# Patient Record
Sex: FEMALE | Race: WHITE
[De-identification: names, ages, dates, MRNs, and addresses within clinical notes are randomized per-mention and may not be internally consistent; named-entity substitution may affect disease eponyms.]

---

## 2021-02-02 ENCOUNTER — HOSPITAL ENCOUNTER (EMERGENCY)
Dept: HOSPITAL 97 - ER | Age: 21
Discharge: LEFT BEFORE DISCHARGE | End: 2021-02-02
Payer: COMMERCIAL

## 2021-02-02 DIAGNOSIS — Z53.21: ICD-10-CM

## 2021-02-02 DIAGNOSIS — R69: Primary | ICD-10-CM

## 2021-02-02 NOTE — ER
Nurse's Notes                                                                                     

 [facility]                                                                 

Name: Kaylin Cai                                                                                 

Age: 20 yrs                                                                                       

Sex: Female                                                                                       

: 2000                                                                                   

MRN: [id number]                                                                                   

Arrival Date: 2021                                                                          

Time: 15:43                                                                                       

Account#: [id number]                                                                            

Bed Waiting                                                                                       

Private MD:                                                                                       

Diagnosis:                                                                                        

                                                                                                  

ED Course:                                                                                        

                                                                                             

15:43 Patient arrived in ED.                                                                  ds1 

                                                                                                  

Administered Medications:                                                                         

No medications were administered                                                                  

                                                                                                  

                                                                                                  

Outcome:                                                                                          

17:31 Eloped from waiting room, Time discovered patient gone: 2021 at 17:31      iw  

17:31 Patient left the ED.                                                                    iw  

                                                                                                  

Signatures:                                                                                       

Alyssa Aguilar                                ds1                                                  

Lorena Grullon, RN                     RN   iw                                                   

                                                                                                  

**************************************************************************************************

## 2021-06-26 ENCOUNTER — HOSPITAL ENCOUNTER (EMERGENCY)
Dept: HOSPITAL 97 - ER | Age: 21
Discharge: HOME | End: 2021-06-26
Payer: COMMERCIAL

## 2021-06-26 VITALS — DIASTOLIC BLOOD PRESSURE: 69 MMHG | OXYGEN SATURATION: 98 % | SYSTOLIC BLOOD PRESSURE: 136 MMHG

## 2021-06-26 VITALS — TEMPERATURE: 98.2 F

## 2021-06-26 DIAGNOSIS — Z32.02: Primary | ICD-10-CM

## 2021-06-26 PROCEDURE — 99283 EMERGENCY DEPT VISIT LOW MDM: CPT

## 2021-06-26 PROCEDURE — 81003 URINALYSIS AUTO W/O SCOPE: CPT

## 2021-06-26 NOTE — EDPHYS
Physician Documentation                                                                           

 [facility]                                                                 

Name: Kaylin Cai                                                                                 

Age: 20 yrs                                                                                       

Sex: Female                                                                                       

: 2000                                                                                   

MRN: [id number]                                                                                   

Arrival Date: 2021                                                                          

Time: 18:50                                                                                       

Account#: [id number]                                                                            

Bed 16                                                                                            

Private MD:                                                                                       

ED Physician Noah Gonzalez                                                                      

HPI:                                                                                              

                                                                                             

19:05 This 20 yrs old  Female presents to ER via Ambulatory with complaints of       cp  

      Abdominal Cramping - needs preg test.                                                       

19:05 The patient presents with abdominal cramps.                                             cp  

19:05 Onset: The symptoms/episode began/occurred 2 day(s) ago.                                cp  

19:05 Associated signs and symptoms: Pertinent positives: vaginal discharge, Pertinent        cp  

      negatives: nausea, vomiting, and diarrhea, constipation, dysuria, fever. The symptoms       

      are described as intermittent. Patient presents to ED requesting pregnancy test.            

      Reports abdominal cramping and taking multiple home pregnancy test that were negative       

      and positive.                                                                               

                                                                                                  

OB/GYN:                                                                                           

19:49 LMP 2021                                                                              ca1 

                                                                                                  

Historical:                                                                                       

- Allergies:                                                                                      

19:05 No Known Allergies;                                                                     ca1 

- Home Meds:                                                                                      

19:05 None [Active];                                                                          ca1 

- PMHx:                                                                                           

19:05 None;                                                                                   ca1 

- PSHx:                                                                                           

19:05 None;                                                                                   ca1 

                                                                                                  

- Immunization history:: Client reports having NOT received the Covid vaccine. Flu                

  vaccine is up to date.                                                                          

- Social history:: Smoking status: Patient reports the use of cigarette tobacco                   

  products, smokes one-half pack cigarettes per day.                                              

                                                                                                  

                                                                                                  

ROS:                                                                                              

19:10 Abdomen/GI: Positive for abdominal cramps, Negative for nausea, vomiting, and diarrhea. cp  

19:10 Eyes: Negative for injury, pain, redness, and discharge.                                cp  

19:10 Constitutional: Negative for fever.                                                         

19:10 Cardiovascular: Negative for chest pain.                                                    

19:10 Respiratory: Negative for cough, shortness of breath, wheezing.                             

19:10 : Positive for vaginal discharge, Negative for urinary symptoms, vaginal bleeding.        

19:10 All other systems are negative.                                                             

                                                                                                  

Exam:                                                                                             

19:15 Constitutional: The patient appears in no acute distress, alert, awake, non-toxic, well cp  

      developed, well nourished.                                                                  

19:15 Head/Face:  Normocephalic, atraumatic.                                                  cp  

19:15 Chest/axilla: Inspection: normal.                                                           

19:15 Cardiovascular: Rate: normal.                                                               

19:15 Respiratory: the patient does not display signs of respiratory distress,  Respirations:     

      normal, no use of accessory muscles, no retractions.                                        

19:15 Abdomen/GI: Exam negative for discomfort, distension, guarding, Inspection: abdomen         

      appears normal.                                                                             

                                                                                                  

Vital Signs:                                                                                      

19:05  / 71; Pulse 73; Resp 16 S; Temp 98.2(O); Pulse Ox 95% on R/A; Weight 65.77 kg    ca1 

      (R); Height 5 ft. 2 in. (157.48 cm) (R); Pain 3/10;                                         

19:49  / 69; Pulse 76; Resp 16 S; Pulse Ox 98% on R/A;                                  ca1 

19:05 Body Mass Index 26.52 (65.77 kg, 157.48 cm)                                             ca1 

                                                                                                  

MDM:                                                                                              

19:07 Patient medically screened.                                                             nae 

19:15 Differential diagnosis: Ectopic Pregnancy, Pelvic Inflammatory Disease, urinary tract   cp  

      infection.                                                                                  

19:47 Data reviewed: vital signs, nurses notes, lab test result(s), and as a result, I will   cp  

      discharge patient.                                                                          

19:47 Counseling: I had a detailed discussion with the patient and/or guardian regarding: the cp  

      historical points, exam findings, and any diagnostic results supporting the                 

      discharge/admit diagnosis, lab results, to return to the emergency department if            

      symptoms worsen or persist or if there are any questions or concerns that arise at home.    

                                                                                                  

                                                                                             

19:32 Order name: Urine Dipstick-Ancillary; Complete Time: 19:41                              EDMS

                                                                                             

19:46 Interpretation: Normal except: UKET Trace.                                              cp  

                                                                                             

19:35 Order name: Urine Pregnancy--Ancillary (enter results)                                  mw2 

                                                                                             

18:58 Order name: Urine Dipstick-Ancillary (obtain specimen); Complete Time: 19:34            cp  

                                                                                             

18:58 Order name: Urine Pregnancy Test (obtain specimen); Complete Time: 19:34                cp  

                                                                                                  

Administered Medications:                                                                         

No medications were administered                                                                  

                                                                                                  

                                                                                                  

Disposition:                                                                                      

                                                                                             

05:35 Co-signature as Attending Physician, Noah Gonzalez MD.                                 [id number] 

                                                                                                  

Disposition:                                                                                      

21 19:47 Discharged to Home. Impression: Encounter for pregnancy test, result negative.     

- Condition is Stable.                                                                            

- Discharge Instructions: Health Maintenance, Female.                                             

                                                                                                  

- Medication Reconciliation Form, Thank You Letter, Antibiotic Education, Prescription            

  Opioid Use form.                                                                                

- Follow up: Private Physician; When: 2 - 3 days; Reason: Worsening of condition.                 

- Problem is new.                                                                                 

- Symptoms have improved.                                                                         

                                                                                                  

                                                                                                  

                                                                                                  

Signatures:                                                                                       

Dispatcher [employer]                           EDMS                                                 

Kane Black MD MD cha Page, Corey, ESTEBAN ORELLANA   cp                                                   

Saritha Nielsen RN                        RN   ca1                                                  

Noah Goznalez MD MD   mh7                                                  

                                                                                                  

Corrections: (The following items were deleted from the chart)                                    

                                                                                             

19:55 19:47 2021 19:47 Discharged to Home. Impression: Encounter for pregnancy test,    ca1 

      result negative. Condition is Stable. Forms are Medication Reconciliation Form, Thank       

      You Letter, Antibiotic Education, Prescription Opioid Use. Follow up: Private               

      Physician; When: 2 - 3 days; Reason: Worsening of condition. Problem is new. Symptoms       

      have improved. cp                                                                           

                                                                                                  

**************************************************************************************************

## 2021-06-26 NOTE — ER
Nurse's Notes                                                                                     

 [facility]                                                                 

Name: Kaylin Cai                                                                                 

Age: 20 yrs                                                                                       

Sex: Female                                                                                       

: 2000                                                                                   

MRN: [id number]                                                                                   

Arrival Date: 2021                                                                          

Time: 18:50                                                                                       

Account#: [id number]                                                                            

Bed 16                                                                                            

Private MD:                                                                                       

Diagnosis: Encounter for pregnancy test, result negative                                          

                                                                                                  

Presentation:                                                                                     

                                                                                             

18:57 Chief complaint: Patient states: pt states she "took a pregnancy test on the  that  tr6 

      showed she was pregnant. since then, has been having abdominal discomfort. took a           

      second pregnancy test results showed negative. took a third one that said positive"         

      would like to take a pregnancy test here because she is unsure. Coronavirus screen: At      

      this time, unable to obtain information related to travel outside the [place] Ebola            

      Screen: Patient negative for fever greater than or equal to 101.5 degrees Fahrenheit,       

      and additional compatible Ebola Virus Disease symptoms Patient denies exposure to           

      infectious person. Patient denies travel to an Ebola-affected area in the 21 days           

      before illness onset. Initial Sepsis Screen: Does the patient meet any 2 criteria? No.      

      Patient's initial sepsis screen is negative. Does the patient have a suspected source       

      of infection? No. Patient's initial sepsis screen is negative. Risk Assessment: Do you      

      want to hurt yourself or someone else? Patient reports no desire to harm self or            

      others. Onset of symptoms was 2021.                                                

18:57 Method Of Arrival: Ambulatory                                                           tr6 

18:57 Acuity: KYLE 4                                                                           tr6 

                                                                                                  

OB/GYN:                                                                                           

19:49 LMP 2021                                                                              ca1 

                                                                                                  

Historical:                                                                                       

- Allergies:                                                                                      

19:05 No Known Allergies;                                                                     ca1 

- Home Meds:                                                                                      

19:05 None [Active];                                                                          ca1 

- PMHx:                                                                                           

19:05 None;                                                                                   ca1 

- PSHx:                                                                                           

19:05 None;                                                                                   ca1 

                                                                                                  

- Immunization history:: Client reports having NOT received the Covid vaccine. Flu                

  vaccine is up to date.                                                                          

- Social history:: Smoking status: Patient reports the use of cigarette tobacco                   

  products, smokes one-half pack cigarettes per day.                                              

                                                                                                  

                                                                                                  

Screenin:59 Abuse screen: Denies threats or abuse. Denies injuries from another. Nutritional        tr6 

      screening: No deficits noted. Tuberculosis screening: No symptoms or risk factors           

      identified. Fall Risk None identified.                                                      

                                                                                                  

Assessment:                                                                                       

19:05 General: Appears in no apparent distress. comfortable, Behavior is calm, cooperative,   ca1 

      appropriate for age. Pain: Complains of pain in right lower quadrant and left lower         

      quadrant Pain currently is 3 out of 10 on a pain scale. Pain began 2-3 days ago. Neuro:     

      Level of Consciousness is awake, alert, obeys commands, Oriented to person, place,          

      time, situation. GI: Abdomen is round non-distended, Bowel sounds present X 4 quads.        

      Abd is soft and non tender X 4 quads. Derm: Skin is intact, is healthy with good            

      turgor, Skin is pink, warm \T\ dry. Musculoskeletal: Circulation, motion, and sensation     

      intact. Capillary refill < 3 seconds.                                                       

19:49 Reassessment: Patient appears in no apparent distress at this time. Patient and/or      ca1 

      family updated on plan of care and expected duration. Pain level reassessed. Patient is     

      alert, oriented x 3, equal unlabored respirations, skin warm/dry/pink.                      

                                                                                                  

Vital Signs:                                                                                      

19:05  / 71; Pulse 73; Resp 16 S; Temp 98.2(O); Pulse Ox 95% on R/A; Weight 65.77 kg    ca1 

      (R); Height 5 ft. 2 in. (157.48 cm) (R); Pain 3/10;                                         

19:49  / 69; Pulse 76; Resp 16 S; Pulse Ox 98% on R/A;                                  ca1 

19:05 Body Mass Index 26.52 (65.77 kg, 157.48 cm)                                             ca1 

                                                                                                  

ED Course:                                                                                        

18:50 Patient arrived in ED.                                                                  as  

18:55 Saritha Nielsen RN is Primary Nurse.                                                      ca1 

18:57 Kane Urena PA is PHCP.                                                                cp  

18:57 Kane Black MD is Attending Physician.                                             cp  

18:59 Triage completed.                                                                       tr6 

18:59 Patient has correct armband on for positive identification. Bed in low position. Call   tr6 

      light in reach. Report given to saritha TIRADO. Pulse ox on. NIBP on. Door closed. Noise         

      minimized. Visitors limited. Lights dimmed.                                                 

19:05 Arm band placed on.                                                                     ca1 

19:05 No provider procedures requiring assistance completed. Patient did not have IV access   ca1 

      during this emergency room visit.                                                           

19:07 Noah Gonzalez MD is Attending Physician.                                             cp  

                                                                                                  

Administered Medications:                                                                         

No medications were administered                                                                  

                                                                                                  

                                                                                                  

Outcome:                                                                                          

19:47 Discharge ordered by MD.                                                                cp  

19:54 Discharged to home ambulatory, with friend.                                             ca1 

19:54 Condition: stable                                                                           

19:54 Discharge instructions given to patient, Instructed on discharge instructions, follow       

      up and referral plans. Demonstrated understanding of instructions, follow-up care.          

19:55 Patient left the ED.                                                                    ca1 

                                                                                                  

Signatures:                                                                                       

Hanna Licea Corey, PA PA cp Acob, Saritha, RN                        RN   ca1                                                  

Archana Resendez RN                   RN   tr6                                                  

                                                                                                  

**************************************************************************************************